# Patient Record
Sex: FEMALE | Race: WHITE | Employment: UNEMPLOYED | URBAN - METROPOLITAN AREA
[De-identification: names, ages, dates, MRNs, and addresses within clinical notes are randomized per-mention and may not be internally consistent; named-entity substitution may affect disease eponyms.]

---

## 2022-01-10 ENCOUNTER — TELEPHONE (OUTPATIENT)
Dept: ONCOLOGY | Age: 37
End: 2022-01-10

## 2022-02-10 NOTE — PROGRESS NOTES
1263 [facility] SPECIALISTS  [address] [place] [address], [address], 2439 [facility]  [address] [facility], [address] [place] [address]  [place], [address]   [phone]  [facility]      Patient ID:  Name:  Moisés Navarro  MRN:  [id number]  :  1985/36 y.o. Date:  2022      HISTORY OF PRESENT ILLNESS:  Moisés Navarro is a 39 y.o.  premenopausal female referred by Dr. Lawrence Kirkpatrick for surgery consult, patient desires to have hysterectomy and BSO. 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 Patient currently undergoing radiation to complete on . Denies any abnormal vaginal bleeding, abdominal pelvic pain. Patient discussed options of hormonal suppression versus oophorectomy. Patient desires hysterectomy/BSO  Labs:  Genetic testing: Pending      Imaging  none       ROS:   As above      There are no problems to display for this patient. Past Medical History:   Diagnosis Date    Breast cancer ([facility] [address]     H/O bilateral mastectomy 2021      Past Surgical History:   Procedure Laterality Date    HX APPENDECTOMY        OB History    No obstetric history on file. Social History     Tobacco Use    Smoking status: Never Smoker    Smokeless tobacco: Never Used   Substance Use Topics    Alcohol use: Not Currently      History reviewed. No pertinent family history. Current Outpatient Medications   Medication Sig    CHOLECALCIFEROL, VITAMIN D3, PO Take  by mouth.  acetaminophen (TYLENOL) 500 mg tablet      No current facility-administered medications for this visit.      Allergies   Allergen Reactions    Codeine Swelling    Motrin [Ibuprofen] Swelling          OBJECTIVE:    Physical Exam  VITAL SIGNS: Visit Vitals  /65 (BP 1 Location: Right upper arm, BP Patient Position: Sitting)   Pulse 68   Temp 98.5 °F (36.9 °C) (Oral)   Ht 5' 5\" (1.651 m)   Wt 47.6 kg (105 lb)   SpO2 100%   BMI 17.47 kg/m²      GENERAL ROSA ISELA: in no apparent distress and well developed and well nourished   MUSCULOSKEL: no joint tenderness, deformity or swelling   INTEGUMENT:  warm and dry, no rashes or lesions   ABDOMEN . soft, NT, ND, No masses appreciated   EXTREMITIES: extremities normal, atraumatic, no cyanosis or edema   PELVIC: Exam deferred. RECTAL: deferred   RACHEL SURVEY: Cervical, supraclavicular, axillary and inguinal nodes normal.   NEURO: Grossly normal         IMPRESSION/PLAN:  1. Breast cancer  -Discussed reasonable option to remove ovaries and uterus as a means to reduce estrogen exposure  -Discussed plan for hysterectomy, BSO  Discussed robotic approach  -Patient will return next week for pelvic exam.  -Risks, benefits and alternatives of surgery discussed in detail. . Risks including bleeding, infection, blood clot, injury to nearby organs including bladder, bowel, ureters and blood vessels.          The total time spent was 60 minutes regarding this patients diagnosis of breast cancer and >50% of this time was spent counseling and coordinating care    82 [facility]  2/11/20229:34 AM

## 2022-02-11 ENCOUNTER — OFFICE VISIT (OUTPATIENT)
Dept: ONCOLOGY | Age: 37
End: 2022-02-11

## 2022-02-11 VITALS
BODY MASS INDEX: 17.49 KG/M2 | SYSTOLIC BLOOD PRESSURE: 130 MMHG | DIASTOLIC BLOOD PRESSURE: 65 MMHG | WEIGHT: 105 LBS | TEMPERATURE: 98.5 F | OXYGEN SATURATION: 100 % | HEART RATE: 68 BPM | HEIGHT: 65 IN

## 2022-02-11 DIAGNOSIS — Z01.818 PREOP TESTING: Primary | ICD-10-CM

## 2022-02-11 DIAGNOSIS — C50.019 MALIGNANT NEOPLASM OF AREOLA OF BREAST IN FEMALE, UNSPECIFIED ESTROGEN RECEPTOR STATUS, UNSPECIFIED LATERALITY (HCC): ICD-10-CM

## 2022-02-11 PROCEDURE — 99205 OFFICE O/P NEW HI 60 MIN: CPT | Performed by: OBSTETRICS & GYNECOLOGY

## 2022-02-11 RX ORDER — ACETAMINOPHEN 500 MG
TABLET ORAL
COMMUNITY
Start: 2021-11-04 | End: 2022-03-08

## 2022-02-11 NOTE — LETTER
2022 9:20 AM    Patient:  France Villavicencio   YOB: 1985  Date of Visit: 2022      Dear Ambrosio Cagle MD  [address] [place] [address]  Via In DO Ronda  [address] [place] [phone]  Via Fax: [phone]: Thank you for referring Ms. France Villavicencio to me for evaluation/treatment. Below are the relevant portions of my assessment and plan of care. If you have questions, please do not hesitate to call me. I look forward to following Ms. Barbosa along with you. 1263 [facility]  [address], [address], 2150 [facility]  [address] [address] [facility], [facility] [address] [facility] [address]  [phone]  Magi Kelsey SORTO      Patient ID:  Name:  France Villavicencio  MRN:  [id number]  :  1985/36 y.o. Date:  2022      HISTORY OF PRESENT ILLNESS:  France Villavicencio is a 39 y.o.  premenopausal female referred by Dr. Elvria Campbell for surgery consult, patient desires to have hysterectomy and BSO. 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 Patient currently undergoing radiation to complete on . Denies any abnormal vaginal bleeding, abdominal pelvic pain. Patient discussed options of hormonal suppression versus oophorectomy. Patient desires hysterectomy/BSO  Labs:  Genetic testing: Pending      Imaging  none       ROS:   As above      There are no problems to display for this patient. Past Medical History:   Diagnosis Date    Breast cancer ([facility] [address]     H/O bilateral mastectomy 2021      Past Surgical History:   Procedure Laterality Date    HX APPENDECTOMY        OB History    No obstetric history on file.        Social History     Tobacco Use    Smoking status: Never Smoker    Smokeless tobacco: Never Used   Substance Use Topics    Alcohol use: Not Currently      History reviewed. No pertinent family history. Current Outpatient Medications   Medication Sig    CHOLECALCIFEROL, VITAMIN D3, PO Take  by mouth.  acetaminophen (TYLENOL) 500 mg tablet      No current facility-administered medications for this visit. Allergies   Allergen Reactions    Codeine Swelling    Motrin [Ibuprofen] Swelling          OBJECTIVE:    Physical Exam  VITAL SIGNS: Visit Vitals  /65 (BP 1 Location: Right upper arm, BP Patient Position: Sitting)   Pulse 68   Temp 98.5 °F (36.9 °C) (Oral)   Ht 5' 5\" (1.651 m)   Wt 47.6 kg (105 lb)   SpO2 100%   BMI 17.47 kg/m²      GENERAL ROSA ISELA: in no apparent distress and well developed and well nourished   MUSCULOSKEL: no joint tenderness, deformity or swelling   INTEGUMENT:  warm and dry, no rashes or lesions   ABDOMEN . soft, NT, ND, No masses appreciated   EXTREMITIES: extremities normal, atraumatic, no cyanosis or edema   PELVIC: Exam deferred. RECTAL: deferred   RACHEL SURVEY: Cervical, supraclavicular, axillary and inguinal nodes normal.   NEURO: Grossly normal         IMPRESSION/PLAN:  1. Breast cancer  -Discussed reasonable option to remove ovaries and uterus as a means to reduce estrogen exposure  -Discussed plan for hysterectomy, BSO  Discussed robotic approach  -Patient will return next week for pelvic exam.  -Risks, benefits and alternatives of surgery discussed in detail. . Risks including bleeding, infection, blood clot, injury to nearby organs including bladder, bowel, ureters and blood vessels. The total time spent was 60 minutes regarding this patients diagnosis of breast cancer and >50% of this time was spent counseling and coordinating care    82 [facility]  2/11/20229:34 Cong Duron is a 39 y.o. female presents in office for surgery consult for hysterectomy. Genetic results pending.     Chief Complaint   Patient presents with    New Patient        Do you have any unusual vaginal bleeding, discharge or irritation? no  Do you have any changes in your bowel movements? no  Have you been experiencing nausea or vomiting? no  Have you been experiencing any continuous or worsening abdominal pain? no  Any urinary burning? no    Visit Vitals  /65 (BP 1 Location: Right upper arm, BP Patient Position: Sitting)   Pulse 68   Temp 98.5 °F (36.9 °C) (Oral)   Ht 5' 5\" (1.651 m)   Wt 105 lb (47.6 kg)   SpO2 100%   BMI 17.47 kg/m²         1. Have you been to the ER, urgent care clinic since your last visit? Hospitalized since your last visit? No    2. Have you seen or consulted any other health care providers outside of the [address] [place] [address] since your last visit? Include any pap smears or colon screening. No    3 most recent PHQ Screens 2/11/2022   Little interest or pleasure in doing things Not at all   Feeling down, depressed, irritable, or hopeless Not at all   Total Score PHQ 2 0       No flowsheet data found. No flowsheet data found. No flowsheet data found.         Sincerely,      Harshad Sotelo MD

## 2022-02-11 NOTE — PROGRESS NOTES
Kenna Mckeon is a 39 y.o. female presents in office for surgery consult for hysterectomy. Genetic results pending. Chief Complaint   Patient presents with    New Patient        Do you have any unusual vaginal bleeding, discharge or irritation? no  Do you have any changes in your bowel movements? no  Have you been experiencing nausea or vomiting? no  Have you been experiencing any continuous or worsening abdominal pain? no  Any urinary burning? no    Visit Vitals  /65 (BP 1 Location: Right upper arm, BP Patient Position: Sitting)   Pulse 68   Temp 98.5 °F (36.9 °C) (Oral)   Ht 5' 5\" (1.651 m)   Wt 105 lb (47.6 kg)   SpO2 100%   BMI 17.47 kg/m²         1. Have you been to the ER, urgent care clinic since your last visit? Hospitalized since your last visit? No    2. Have you seen or consulted any other health care providers outside of the [address] [place] [address] since your last visit? Include any pap smears or colon screening. No    3 most recent PHQ Screens 2/11/2022   Little interest or pleasure in doing things Not at all   Feeling down, depressed, irritable, or hopeless Not at all   Total Score PHQ 2 0       No flowsheet data found. No flowsheet data found. No flowsheet data found.

## 2022-02-18 DIAGNOSIS — Z01.818 PREOP TESTING: ICD-10-CM

## 2022-02-21 ENCOUNTER — OFFICE VISIT (OUTPATIENT)
Dept: ONCOLOGY | Age: 37
End: 2022-02-21
Payer: OTHER GOVERNMENT

## 2022-02-21 ENCOUNTER — TRANSCRIBE ORDER (OUTPATIENT)
Dept: REGISTRATION | Age: 37
End: 2022-02-21

## 2022-02-21 ENCOUNTER — HOSPITAL ENCOUNTER (OUTPATIENT)
Dept: PREADMISSION TESTING | Age: 37
Discharge: HOME | End: 2022-02-21
Payer: OTHER GOVERNMENT

## 2022-02-21 VITALS
SYSTOLIC BLOOD PRESSURE: 127 MMHG | DIASTOLIC BLOOD PRESSURE: 79 MMHG | RESPIRATION RATE: 16 BRPM | WEIGHT: 107.4 LBS | HEART RATE: 75 BPM | BODY MASS INDEX: 17.87 KG/M2 | OXYGEN SATURATION: 100 % | TEMPERATURE: 98.7 F

## 2022-02-21 DIAGNOSIS — C50.019: Primary | ICD-10-CM

## 2022-02-21 DIAGNOSIS — Z01.818 PREOP TESTING: ICD-10-CM

## 2022-02-21 DIAGNOSIS — C50.019 MALIGNANT NEOPLASM OF AREOLA OF BREAST IN FEMALE, UNSPECIFIED ESTROGEN RECEPTOR STATUS, UNSPECIFIED LATERALITY (HCC): Primary | ICD-10-CM

## 2022-02-21 DIAGNOSIS — Z01.812 BLOOD TESTS PRIOR TO TREATMENT OR PROCEDURE: ICD-10-CM

## 2022-02-21 LAB
ALBUMIN SERPL-MCNC: 3.8 G/DL (ref 3.4–5)
ALBUMIN/GLOB SERPL: 1.3 {RATIO} (ref 0.8–1.7)
ALP SERPL-CCNC: 55 U/L (ref 45–117)
ALT SERPL-CCNC: 18 U/L (ref 13–56)
ANION GAP SERPL CALC-SCNC: 4 MMOL/L (ref 3–18)
AST SERPL-CCNC: 15 U/L (ref 10–38)
ATRIAL RATE: 66 BPM
BILIRUB SERPL-MCNC: 0.6 MG/DL (ref 0.2–1)
BUN SERPL-MCNC: 9 MG/DL (ref 7–18)
BUN/CREAT SERPL: 13 (ref 12–20)
CALCIUM SERPL-MCNC: 8.8 MG/DL (ref 8.5–10.1)
CALCULATED P AXIS, ECG09: 71 DEGREES
CALCULATED R AXIS, ECG10: 89 DEGREES
CALCULATED T AXIS, ECG11: 83 DEGREES
CHLORIDE SERPL-SCNC: 108 MMOL/L (ref 100–111)
CO2 SERPL-SCNC: 28 MMOL/L (ref 21–32)
CREAT SERPL-MCNC: 0.67 MG/DL (ref 0.6–1.3)
DIAGNOSIS, 93000: NORMAL
ERYTHROCYTE [DISTWIDTH] IN BLOOD BY AUTOMATED COUNT: 15.7 % (ref 11.6–14.5)
GLOBULIN SER CALC-MCNC: 3 G/DL (ref 2–4)
GLUCOSE SERPL-MCNC: 76 MG/DL (ref 74–99)
HCG SERPL QL: NEGATIVE
HCT VFR BLD AUTO: 37.6 % (ref 35–45)
HGB BLD-MCNC: 11.8 G/DL (ref 12–16)
MCH RBC QN AUTO: 25.2 PG (ref 24–34)
MCHC RBC AUTO-ENTMCNC: 31.4 G/DL (ref 31–37)
MCV RBC AUTO: 80.2 FL (ref 78–100)
NRBC # BLD: 0 K/UL (ref 0–0.01)
NRBC BLD-RTO: 0 PER 100 WBC
P-R INTERVAL, ECG05: 148 MS
PLATELET # BLD AUTO: 304 K/UL (ref 135–420)
PMV BLD AUTO: 11.2 FL (ref 9.2–11.8)
POTASSIUM SERPL-SCNC: 3.9 MMOL/L (ref 3.5–5.5)
PROT SERPL-MCNC: 6.8 G/DL (ref 6.4–8.2)
Q-T INTERVAL, ECG07: 392 MS
QRS DURATION, ECG06: 82 MS
QTC CALCULATION (BEZET), ECG08: 410 MS
RBC # BLD AUTO: 4.69 M/UL (ref 4.2–5.3)
SODIUM SERPL-SCNC: 140 MMOL/L (ref 136–145)
VENTRICULAR RATE, ECG03: 66 BPM
WBC # BLD AUTO: 5.3 K/UL (ref 4.6–13.2)

## 2022-02-21 PROCEDURE — 84703 CHORIONIC GONADOTROPIN ASSAY: CPT

## 2022-02-21 PROCEDURE — 36415 COLL VENOUS BLD VENIPUNCTURE: CPT

## 2022-02-21 PROCEDURE — 93005 ELECTROCARDIOGRAM TRACING: CPT

## 2022-02-21 PROCEDURE — 80053 COMPREHEN METABOLIC PANEL: CPT

## 2022-02-21 PROCEDURE — 99213 OFFICE O/P EST LOW 20 MIN: CPT | Performed by: OBSTETRICS & GYNECOLOGY

## 2022-02-21 PROCEDURE — 85027 COMPLETE CBC AUTOMATED: CPT

## 2022-02-21 NOTE — PROGRESS NOTES
1263 [facility] SPECIAL[facility]  [address] [place] [address], [address], [facility] [facility]  [address] [facility], [address] [place] [address]  [place], [address]   [phone]  [facility]      Patient ID:  Name:  Twyla Frias  MRN:  [id number]  :  1985/36 y.o. Date:  2022      HISTORY OF PRESENT ILLNESS:  Twyla Frias is a 39 y.o.  premenopausal female referred by Dr. Lawrence Kirkpatrick for surgery consult, patient desires to have hysterectomy and BSO. 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 Patient currently undergoing radiation to complete on . Denies any abnormal vaginal bleeding, abdominal pelvic pain. Patient discussed options of hormonal suppression versus oophorectomy. Patient desires hysterectomy/BSO patient here for pelvic exam  Labs:  Genetic testing: Pending      Imaging  none       ROS:   As above      There are no problems to display for this patient. Past Medical History:   Diagnosis Date    Breast cancer ([facility] [address]     H/O bilateral mastectomy 2021      Past Surgical History:   Procedure Laterality Date    HX APPENDECTOMY        OB History    No obstetric history on file. Social History     Tobacco Use    Smoking status: Never Smoker    Smokeless tobacco: Never Used   Substance Use Topics    Alcohol use: Not Currently      No family history on file. Current Outpatient Medications   Medication Sig    CHOLECALCIFEROL, VITAMIN D3, PO Take  by mouth.  acetaminophen (TYLENOL) 500 mg tablet      No current facility-administered medications for this visit.      Allergies   Allergen Reactions    Codeine Swelling    Motrin [Ibuprofen] Swelling          OBJECTIVE:    Physical Exam  VITAL SIGNS: Visit Vitals  /79 (BP 1 Location: Right arm, BP Patient Position: Sitting, BP Cuff Size: Adult)   Pulse 75   Temp 98.7 °F (37.1 °C) (Oral)   Resp 16   Wt 48.7 kg (107 lb 6.4 oz)   SpO2 100%   BMI 17.87 kg/m²      GENERAL ROSA ISELA: in no apparent distress and well developed and well nourished   MUSCULOSKEL: no joint tenderness, deformity or swelling   INTEGUMENT:  warm and dry, no rashes or lesions   ABDOMEN . soft, NT, ND, No masses appreciated   EXTREMITIES: extremities normal, atraumatic, no cyanosis or edema   PELVIC: Nefg. Spec exam revealed normal-appearing vaginal mucosa and cervix. Bimanual exam revealed normal uterus tubes and ovaries no adnexal masses appreciated. RECTAL: deferred   RACHEL SURVEY: Cervical, supraclavicular, axillary and inguinal nodes normal.   NEURO: Grossly normal         IMPRESSION/PLAN:  1. Breast cancer  -Discussed reasonable option to remove ovaries and uterus as a means to reduce estrogen exposure  -Discussed plan for hysterectomy, BSO  Discussed robotic approach  -Pelvic exam performed  -Risks, benefits and alternatives of surgery discussed in detail. . Risks including bleeding, infection, blood clot, injury to nearby organs including bladder, bowel, ureters and blood vessels.          The total time spent was 60 minutes regarding this patients diagnosis of breast cancer and >50% of this time was spent counseling and coordinating care    82 [facility]  3/00/56247:85 AM

## 2022-02-21 NOTE — H&P (VIEW-ONLY)
1263 [facility] SPECIALISTS  [address] [place] [address], [address], 1390 [facility]  [address] [facility], [address] [facility]  [place], [address] [facility]224 [phone] [phone]  Jeremie Ferguson       Patient ID:  Name:  Rosalia Talavera  MRN:  [id number]  :  1985/36 y.o. Date:  2022      HISTORY OF PRESENT ILLNESS:  Rosalia Talavera is a 39 y.o.  premenopausal female referred by Dr. Apollo Freeman for surgery consult, patient desires to have hysterectomy and BSO. 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 Patient currently undergoing radiation to complete on . Denies any abnormal vaginal bleeding, abdominal pelvic pain. Patient discussed options of hormonal suppression versus oophorectomy. Patient desires hysterectomy/BSO patient here for pelvic exam  Labs:  Genetic testing: Pending      Imaging  none       ROS:   As above      There are no problems to display for this patient. Past Medical History:   Diagnosis Date    Breast cancer ([facility] [address]     H/O bilateral mastectomy 2021      Past Surgical History:   Procedure Laterality Date    HX APPENDECTOMY        OB History    No obstetric history on file. Social History     Tobacco Use    Smoking status: Never Smoker    Smokeless tobacco: Never Used   Substance Use Topics    Alcohol use: Not Currently      No family history on file. Current Outpatient Medications   Medication Sig    CHOLECALCIFEROL, VITAMIN D3, PO Take  by mouth.  acetaminophen (TYLENOL) 500 mg tablet      No current facility-administered medications for this visit.      Allergies   Allergen Reactions    Codeine Swelling    Motrin [Ibuprofen] Swelling          OBJECTIVE:    Physical Exam  VITAL SIGNS: Visit Vitals  /79 (BP 1 Location: Right arm, BP Patient Position: Sitting, BP Cuff Size: Adult)   Pulse 75   Temp 98.7 °F (37.1 °C) (Oral)   Resp 16   Wt 48.7 kg (107 lb 6.4 oz)   SpO2 100%   BMI 17.87 kg/m²      GENERAL ROSA ISELA: in no apparent distress and well developed and well nourished   MUSCULOSKEL: no joint tenderness, deformity or swelling   INTEGUMENT:  warm and dry, no rashes or lesions   ABDOMEN . soft, NT, ND, No masses appreciated   EXTREMITIES: extremities normal, atraumatic, no cyanosis or edema   PELVIC: Nefg. Spec exam revealed normal-appearing vaginal mucosa and cervix. Bimanual exam revealed normal uterus tubes and ovaries no adnexal masses appreciated. RECTAL: deferred   RACHEL SURVEY: Cervical, supraclavicular, axillary and inguinal nodes normal.   NEURO: Grossly normal         IMPRESSION/PLAN:  1. Breast cancer  -Discussed reasonable option to remove ovaries and uterus as a means to reduce estrogen exposure  -Discussed plan for hysterectomy, BSO  Discussed robotic approach  -Pelvic exam performed  -Risks, benefits and alternatives of surgery discussed in detail. . Risks including bleeding, infection, blood clot, injury to nearby organs including bladder, bowel, ureters and blood vessels.          The total time spent was 60 minutes regarding this patients diagnosis of breast cancer and >50% of this time was spent counseling and coordinating care    82 [facility]  0/02/69891:32 AM

## 2022-02-21 NOTE — PROGRESS NOTES
Patient here ambulatory for follow up prior to having her \"hysterectomy\". She is completing radiation for breast cancer as of Friiday. Denies any complaints as this time.

## 2022-02-21 NOTE — LETTER
2/21/2022    Patient: Claudia Shankar   YOB: 1985   Date of Visit: 2/21/2022     Jane Cevallos, [address] [place] [id number]  Via Fax: [phone]     Mera Sloano MD  [address] [place] [address]  Via In [facility] [address]    Dear Jane Cevallos, [facility]  Mera Solano MD,      Thank you for referring Ms. Hilda Barbosa to [facility] for evaluation. My notes for this consultation are attached. If you have questions, please do not hesitate to call me. I look forward to following your patient along with you.       Sincerely,    Danika Christian MD

## 2022-03-04 ENCOUNTER — HOSPITAL ENCOUNTER (OUTPATIENT)
Dept: PREADMISSION TESTING | Age: 37
Discharge: HOME | End: 2022-03-04
Payer: OTHER GOVERNMENT

## 2022-03-04 PROCEDURE — U0003 INFECTIOUS AGENT DETECTION BY NUCLEIC ACID (DNA OR RNA); SEVERE ACUTE RESPIRATORY SYNDROME CORONAVIRUS 2 (SARS-COV-2) (CORONAVIRUS DISEASE [COVID-19]), AMPLIFIED PROBE TECHNIQUE, MAKING USE OF HIGH THROUGHPUT TECHNOLOGIES AS DESCRIBED BY CMS-2020-01-R: HCPCS

## 2022-03-05 LAB — SARS-COV-2, COV2NT: NOT DETECTED

## 2022-03-07 ENCOUNTER — OFFICE VISIT (OUTPATIENT)
Dept: ONCOLOGY | Age: 37
End: 2022-03-07

## 2022-03-07 DIAGNOSIS — Z71.89 SURGICAL COUNSELING VISIT: Primary | ICD-10-CM

## 2022-03-07 NOTE — PROGRESS NOTES
Reviewed information packet for a robotic total laparoscopic hysterectomy, bilateral salpingo-oophorectomy scheduled on 3/10/22 at 9:30 am with an arrival time of 7:30 am. Patient was given information packet that included pre-op and post-op instructions as well as the scheduled date, start time, arrival time, and length of the surgery. Patient expressed understanding and had no further questions. Patient was encouraged to call if they have questions later. The patient was counseled at length about the risks of norma Covid-19 during their perioperative period and any recovery window from their procedure. The patient was made aware that norma Covid-19  may worsen their prognosis for recovering from their procedure and lend to a higher morbidity and/or mortality risk. All material risks, benefits, and reasonable alternatives including postponing the procedure were discussed. The patient does wish to proceed with the procedure at this time.
[id number]

## 2022-03-08 ENCOUNTER — HOSPITAL ENCOUNTER (OUTPATIENT)
Dept: PREADMISSION TESTING | Age: 37
Discharge: HOME | End: 2022-03-08

## 2022-03-08 VITALS — WEIGHT: 107 LBS | BODY MASS INDEX: 17.83 KG/M2 | HEIGHT: 65 IN

## 2022-03-08 RX ORDER — HEPARIN SODIUM 5000 [USP'U]/ML
5000 INJECTION, SOLUTION INTRAVENOUS; SUBCUTANEOUS ONCE
Status: CANCELLED | OUTPATIENT
Start: 2022-03-08 | End: 2022-03-08

## 2022-03-08 RX ORDER — BISMUTH SUBSALICYLATE 262 MG
1 TABLET,CHEWABLE ORAL DAILY
COMMUNITY

## 2022-03-08 RX ORDER — SODIUM CHLORIDE, SODIUM LACTATE, POTASSIUM CHLORIDE, CALCIUM CHLORIDE 600; 310; 30; 20 MG/100ML; MG/100ML; MG/100ML; MG/100ML
125 INJECTION, SOLUTION INTRAVENOUS CONTINUOUS
Status: CANCELLED | OUTPATIENT
Start: 2022-03-08

## 2022-03-08 NOTE — PERIOP NOTES
PAT phone interview completed. Patient made aware to be NPO. Patient stated she had two doses of COVID vaccine. Patient statted she had COVID test 3/4. Patient made aware not to wear jewelry, makeup, nail polish, lotion, oil or spray on DOS. Patient stated she has a ride for discharge and someone to stay with her for 24 hours after procedure. Reviewed surgical skin preparation with patient. Patient stated understanding. Opportunity for questions given. Patient stated she does not have a DNR order.

## 2022-03-09 ENCOUNTER — TELEPHONE (OUTPATIENT)
Dept: ONCOLOGY | Age: 37
End: 2022-03-09

## 2022-03-09 NOTE — TELEPHONE ENCOUNTER
Patient confirmed arrival and surgery start time for 3/10/2022. Patient is to arrive at 7:30 AM with a 9:30 AM surgery start time.

## 2022-03-10 ENCOUNTER — ANESTHESIA (OUTPATIENT)
Dept: SURGERY | Age: 37
End: 2022-03-10
Payer: OTHER GOVERNMENT

## 2022-03-10 ENCOUNTER — HOSPITAL ENCOUNTER (OUTPATIENT)
Age: 37
Setting detail: OUTPATIENT SURGERY
Discharge: HOME | End: 2022-03-10
Attending: OBSTETRICS & GYNECOLOGY | Admitting: OBSTETRICS & GYNECOLOGY
Payer: OTHER GOVERNMENT

## 2022-03-10 ENCOUNTER — ANESTHESIA EVENT (OUTPATIENT)
Dept: SURGERY | Age: 37
End: 2022-03-10
Payer: OTHER GOVERNMENT

## 2022-03-10 VITALS
TEMPERATURE: 97.4 F | BODY MASS INDEX: 17.04 KG/M2 | HEIGHT: 65 IN | OXYGEN SATURATION: 100 % | RESPIRATION RATE: 16 BRPM | HEART RATE: 61 BPM | DIASTOLIC BLOOD PRESSURE: 44 MMHG | SYSTOLIC BLOOD PRESSURE: 93 MMHG | WEIGHT: 102.3 LBS

## 2022-03-10 DIAGNOSIS — Z85.3 HISTORY OF BREAST CANCER: ICD-10-CM

## 2022-03-10 DIAGNOSIS — R10.2 PELVIC PAIN: ICD-10-CM

## 2022-03-10 LAB
ABO + RH BLD: NORMAL
BLOOD GROUP ANTIBODIES SERPL: NORMAL
HCG UR QL: NEGATIVE
SPECIMEN EXP DATE BLD: NORMAL

## 2022-03-10 PROCEDURE — 74011250636 HC RX REV CODE- 250/636: Performed by: NURSE ANESTHETIST, CERTIFIED REGISTERED

## 2022-03-10 PROCEDURE — 77030008477 HC STYL SATN SLP COVD -A: Performed by: STUDENT IN AN ORGANIZED HEALTH CARE EDUCATION/TRAINING PROGRAM

## 2022-03-10 PROCEDURE — 58571 TLH W/T/O 250 G OR LESS: CPT | Performed by: OBSTETRICS & GYNECOLOGY

## 2022-03-10 PROCEDURE — 77030006643: Performed by: STUDENT IN AN ORGANIZED HEALTH CARE EDUCATION/TRAINING PROGRAM

## 2022-03-10 PROCEDURE — 77030040830 HC CATH URETH FOL MDII -A: Performed by: OBSTETRICS & GYNECOLOGY

## 2022-03-10 PROCEDURE — 74011250636 HC RX REV CODE- 250/636: Performed by: OBSTETRICS & GYNECOLOGY

## 2022-03-10 PROCEDURE — 77030008683 HC TU ET CUF COVD -A: Performed by: STUDENT IN AN ORGANIZED HEALTH CARE EDUCATION/TRAINING PROGRAM

## 2022-03-10 PROCEDURE — 77030002933 HC SUT MCRYL J&J -A: Performed by: OBSTETRICS & GYNECOLOGY

## 2022-03-10 PROCEDURE — 77030025805 HC MANIP UTER RUMI COOP -B: Performed by: OBSTETRICS & GYNECOLOGY

## 2022-03-10 PROCEDURE — 77030035277 HC OBTRTR BLDELSS DISP INTU -B: Performed by: OBSTETRICS & GYNECOLOGY

## 2022-03-10 PROCEDURE — 76060000033 HC ANESTHESIA 1 TO 1.5 HR: Performed by: OBSTETRICS & GYNECOLOGY

## 2022-03-10 PROCEDURE — 74011000250 HC RX REV CODE- 250: Performed by: NURSE ANESTHETIST, CERTIFIED REGISTERED

## 2022-03-10 PROCEDURE — C9290 INJ, BUPIVACAINE LIPOSOME: HCPCS | Performed by: OBSTETRICS & GYNECOLOGY

## 2022-03-10 PROCEDURE — 77030010507 HC ADH SKN DERMBND J&J -B: Performed by: OBSTETRICS & GYNECOLOGY

## 2022-03-10 PROCEDURE — 76210000016 HC OR PH I REC 1 TO 1.5 HR: Performed by: OBSTETRICS & GYNECOLOGY

## 2022-03-10 PROCEDURE — 77030016151 HC PROTCTR LNS DFOG COVD -B: Performed by: OBSTETRICS & GYNECOLOGY

## 2022-03-10 PROCEDURE — 81025 URINE PREGNANCY TEST: CPT

## 2022-03-10 PROCEDURE — 77030040361 HC SLV COMPR DVT MDII -B: Performed by: OBSTETRICS & GYNECOLOGY

## 2022-03-10 PROCEDURE — 77030031492 HC PRT ACC BLNT AIRSEAL CNMD -B: Performed by: OBSTETRICS & GYNECOLOGY

## 2022-03-10 PROCEDURE — 88307 TISSUE EXAM BY PATHOLOGIST: CPT

## 2022-03-10 PROCEDURE — 77030020782 HC GWN BAIR PAWS FLX 3M -B: Performed by: OBSTETRICS & GYNECOLOGY

## 2022-03-10 PROCEDURE — 77030014063 HC TIP MANIP UTER COOP -B: Performed by: OBSTETRICS & GYNECOLOGY

## 2022-03-10 PROCEDURE — 74011000250 HC RX REV CODE- 250: Performed by: OBSTETRICS & GYNECOLOGY

## 2022-03-10 PROCEDURE — 76010000934 HC OR TIME 1 TO 1.5HR INTENSV - TIER 2: Performed by: OBSTETRICS & GYNECOLOGY

## 2022-03-10 PROCEDURE — 77030008574 HC TBNG SUC IRR STRY -B: Performed by: OBSTETRICS & GYNECOLOGY

## 2022-03-10 PROCEDURE — 77030002966 HC SUT PDS J&J -A: Performed by: OBSTETRICS & GYNECOLOGY

## 2022-03-10 PROCEDURE — 86900 BLOOD TYPING SEROLOGIC ABO: CPT

## 2022-03-10 PROCEDURE — 77030020703 HC SEAL CANN DISP INTU -B: Performed by: OBSTETRICS & GYNECOLOGY

## 2022-03-10 PROCEDURE — 76210000026 HC REC RM PH II 1 TO 1.5 HR: Performed by: OBSTETRICS & GYNECOLOGY

## 2022-03-10 PROCEDURE — 2709999900 HC NON-CHARGEABLE SUPPLY: Performed by: OBSTETRICS & GYNECOLOGY

## 2022-03-10 PROCEDURE — 36415 COLL VENOUS BLD VENIPUNCTURE: CPT

## 2022-03-10 RX ORDER — NALOXONE HYDROCHLORIDE 0.4 MG/ML
0.04 INJECTION, SOLUTION INTRAMUSCULAR; INTRAVENOUS; SUBCUTANEOUS
Status: DISCONTINUED | OUTPATIENT
Start: 2022-03-10 | End: 2022-03-10 | Stop reason: HOSPADM

## 2022-03-10 RX ORDER — MIDAZOLAM HYDROCHLORIDE 1 MG/ML
INJECTION, SOLUTION INTRAMUSCULAR; INTRAVENOUS AS NEEDED
Status: DISCONTINUED | OUTPATIENT
Start: 2022-03-10 | End: 2022-03-10 | Stop reason: HOSPADM

## 2022-03-10 RX ORDER — LIDOCAINE HYDROCHLORIDE 20 MG/ML
INJECTION, SOLUTION EPIDURAL; INFILTRATION; INTRACAUDAL; PERINEURAL AS NEEDED
Status: DISCONTINUED | OUTPATIENT
Start: 2022-03-10 | End: 2022-03-10 | Stop reason: HOSPADM

## 2022-03-10 RX ORDER — METOCLOPRAMIDE HYDROCHLORIDE 5 MG/ML
INJECTION INTRAMUSCULAR; INTRAVENOUS AS NEEDED
Status: DISCONTINUED | OUTPATIENT
Start: 2022-03-10 | End: 2022-03-10 | Stop reason: HOSPADM

## 2022-03-10 RX ORDER — DEXAMETHASONE SODIUM PHOSPHATE 4 MG/ML
INJECTION, SOLUTION INTRA-ARTICULAR; INTRALESIONAL; INTRAMUSCULAR; INTRAVENOUS; SOFT TISSUE AS NEEDED
Status: DISCONTINUED | OUTPATIENT
Start: 2022-03-10 | End: 2022-03-10 | Stop reason: HOSPADM

## 2022-03-10 RX ORDER — NALBUPHINE HYDROCHLORIDE 10 MG/ML
5 INJECTION, SOLUTION INTRAMUSCULAR; INTRAVENOUS; SUBCUTANEOUS
Status: DISCONTINUED | OUTPATIENT
Start: 2022-03-10 | End: 2022-03-10 | Stop reason: HOSPADM

## 2022-03-10 RX ORDER — SODIUM CHLORIDE, SODIUM LACTATE, POTASSIUM CHLORIDE, CALCIUM CHLORIDE 600; 310; 30; 20 MG/100ML; MG/100ML; MG/100ML; MG/100ML
50 INJECTION, SOLUTION INTRAVENOUS CONTINUOUS
Status: CANCELLED | OUTPATIENT
Start: 2022-03-10

## 2022-03-10 RX ORDER — SODIUM CHLORIDE 0.9 % (FLUSH) 0.9 %
5-40 SYRINGE (ML) INJECTION AS NEEDED
Status: DISCONTINUED | OUTPATIENT
Start: 2022-03-10 | End: 2022-03-10 | Stop reason: HOSPADM

## 2022-03-10 RX ORDER — KETAMINE HCL IN 0.9 % NACL 50 MG/5 ML
SYRINGE (ML) INTRAVENOUS AS NEEDED
Status: DISCONTINUED | OUTPATIENT
Start: 2022-03-10 | End: 2022-03-10 | Stop reason: HOSPADM

## 2022-03-10 RX ORDER — FENTANYL CITRATE 50 UG/ML
50 INJECTION, SOLUTION INTRAMUSCULAR; INTRAVENOUS
Status: DISCONTINUED | OUTPATIENT
Start: 2022-03-10 | End: 2022-03-10 | Stop reason: HOSPADM

## 2022-03-10 RX ORDER — HYDROMORPHONE HYDROCHLORIDE 1 MG/ML
0.5 INJECTION, SOLUTION INTRAMUSCULAR; INTRAVENOUS; SUBCUTANEOUS AS NEEDED
Status: DISCONTINUED | OUTPATIENT
Start: 2022-03-10 | End: 2022-03-10 | Stop reason: HOSPADM

## 2022-03-10 RX ORDER — EPHEDRINE SULFATE/0.9% NACL/PF 50 MG/5 ML
SYRINGE (ML) INTRAVENOUS AS NEEDED
Status: DISCONTINUED | OUTPATIENT
Start: 2022-03-10 | End: 2022-03-10 | Stop reason: HOSPADM

## 2022-03-10 RX ORDER — SODIUM CHLORIDE, SODIUM LACTATE, POTASSIUM CHLORIDE, CALCIUM CHLORIDE 600; 310; 30; 20 MG/100ML; MG/100ML; MG/100ML; MG/100ML
50 INJECTION, SOLUTION INTRAVENOUS CONTINUOUS
Status: DISCONTINUED | OUTPATIENT
Start: 2022-03-10 | End: 2022-03-10 | Stop reason: HOSPADM

## 2022-03-10 RX ORDER — SODIUM CHLORIDE 0.9 % (FLUSH) 0.9 %
5-40 SYRINGE (ML) INJECTION EVERY 8 HOURS
Status: DISCONTINUED | OUTPATIENT
Start: 2022-03-10 | End: 2022-03-10 | Stop reason: HOSPADM

## 2022-03-10 RX ORDER — SODIUM CHLORIDE, SODIUM LACTATE, POTASSIUM CHLORIDE, CALCIUM CHLORIDE 600; 310; 30; 20 MG/100ML; MG/100ML; MG/100ML; MG/100ML
125 INJECTION, SOLUTION INTRAVENOUS CONTINUOUS
Status: DISCONTINUED | OUTPATIENT
Start: 2022-03-10 | End: 2022-03-10 | Stop reason: HOSPADM

## 2022-03-10 RX ORDER — ONDANSETRON 2 MG/ML
INJECTION INTRAMUSCULAR; INTRAVENOUS AS NEEDED
Status: DISCONTINUED | OUTPATIENT
Start: 2022-03-10 | End: 2022-03-10 | Stop reason: HOSPADM

## 2022-03-10 RX ORDER — ROCURONIUM BROMIDE 10 MG/ML
INJECTION, SOLUTION INTRAVENOUS AS NEEDED
Status: DISCONTINUED | OUTPATIENT
Start: 2022-03-10 | End: 2022-03-10 | Stop reason: HOSPADM

## 2022-03-10 RX ORDER — CEFAZOLIN SODIUM/WATER 2 G/20 ML
2 SYRINGE (ML) INTRAVENOUS ONCE
Status: COMPLETED | OUTPATIENT
Start: 2022-03-10 | End: 2022-03-10

## 2022-03-10 RX ORDER — PROPOFOL 10 MG/ML
INJECTION, EMULSION INTRAVENOUS AS NEEDED
Status: DISCONTINUED | OUTPATIENT
Start: 2022-03-10 | End: 2022-03-10 | Stop reason: HOSPADM

## 2022-03-10 RX ORDER — DIPHENHYDRAMINE HYDROCHLORIDE 50 MG/ML
12.5 INJECTION, SOLUTION INTRAMUSCULAR; INTRAVENOUS
Status: DISCONTINUED | OUTPATIENT
Start: 2022-03-10 | End: 2022-03-10 | Stop reason: HOSPADM

## 2022-03-10 RX ORDER — HEPARIN SODIUM 5000 [USP'U]/ML
5000 INJECTION, SOLUTION INTRAVENOUS; SUBCUTANEOUS ONCE
Status: COMPLETED | OUTPATIENT
Start: 2022-03-10 | End: 2022-03-10

## 2022-03-10 RX ORDER — POLYETHYLENE GLYCOL 3350 17 G/17G
17 POWDER, FOR SOLUTION ORAL DAILY
COMMUNITY

## 2022-03-10 RX ORDER — FENTANYL CITRATE 50 UG/ML
INJECTION, SOLUTION INTRAMUSCULAR; INTRAVENOUS AS NEEDED
Status: DISCONTINUED | OUTPATIENT
Start: 2022-03-10 | End: 2022-03-10 | Stop reason: HOSPADM

## 2022-03-10 RX ADMIN — METOCLOPRAMIDE 10 MG: 5 INJECTION, SOLUTION INTRAMUSCULAR; INTRAVENOUS at 09:05

## 2022-03-10 RX ADMIN — HEPARIN SODIUM 5000 UNITS: 5000 INJECTION INTRAVENOUS; SUBCUTANEOUS at 08:54

## 2022-03-10 RX ADMIN — SODIUM CHLORIDE, SODIUM LACTATE, POTASSIUM CHLORIDE, AND CALCIUM CHLORIDE: 600; 310; 30; 20 INJECTION, SOLUTION INTRAVENOUS at 09:45

## 2022-03-10 RX ADMIN — SODIUM CHLORIDE, SODIUM LACTATE, POTASSIUM CHLORIDE, AND CALCIUM CHLORIDE 125 ML/HR: 600; 310; 30; 20 INJECTION, SOLUTION INTRAVENOUS at 08:41

## 2022-03-10 RX ADMIN — Medication 10 MG: at 09:12

## 2022-03-10 RX ADMIN — SODIUM CHLORIDE, SODIUM LACTATE, POTASSIUM CHLORIDE, AND CALCIUM CHLORIDE: 600; 310; 30; 20 INJECTION, SOLUTION INTRAVENOUS at 10:30

## 2022-03-10 RX ADMIN — PROPOFOL 150 MG: 10 INJECTION, EMULSION INTRAVENOUS at 09:14

## 2022-03-10 RX ADMIN — LIDOCAINE HYDROCHLORIDE 60 MG: 20 INJECTION, SOLUTION INTRAVENOUS at 09:14

## 2022-03-10 RX ADMIN — ONDANSETRON HYDROCHLORIDE 4 MG: 2 INJECTION INTRAMUSCULAR; INTRAVENOUS at 10:03

## 2022-03-10 RX ADMIN — SUGAMMADEX 200 MG: 100 INJECTION, SOLUTION INTRAVENOUS at 10:13

## 2022-03-10 RX ADMIN — FENTANYL CITRATE 50 MCG: 50 INJECTION, SOLUTION INTRAMUSCULAR; INTRAVENOUS at 09:10

## 2022-03-10 RX ADMIN — Medication 2 G: at 09:23

## 2022-03-10 RX ADMIN — Medication 20 MG: at 09:08

## 2022-03-10 RX ADMIN — ROCURONIUM BROMIDE 50 MG: 10 INJECTION, SOLUTION INTRAVENOUS at 09:14

## 2022-03-10 RX ADMIN — FENTANYL CITRATE 50 MCG: 50 INJECTION, SOLUTION INTRAMUSCULAR; INTRAVENOUS at 09:05

## 2022-03-10 RX ADMIN — MIDAZOLAM HYDROCHLORIDE 5 MG: 1 INJECTION, SOLUTION INTRAMUSCULAR; INTRAVENOUS at 09:05

## 2022-03-10 RX ADMIN — DEXAMETHASONE SODIUM PHOSPHATE 8 MG: 4 INJECTION, SOLUTION INTRAMUSCULAR; INTRAVENOUS at 09:33

## 2022-03-10 RX ADMIN — Medication 20 MG: at 09:21

## 2022-03-10 RX ADMIN — Medication 10 MG: at 10:10

## 2022-03-10 NOTE — PERIOP NOTES
Reviewed PTA medication list with patient/caregiver and patient/caregiver denies any additional medications. Patient admits to having a responsible adult care for them at home for at least 24 hours after surgery. Patient encouraged to use gown warming system and informed that using said warming gown to regulate body temperature prior to a procedure has been shown to help reduce the risks of blood clots and infection. Patient's pharmacy of choice verified and documented in PTA medication section. Dual skin assessment & fall risk band verification completed with Armin Valverde RN.

## 2022-03-10 NOTE — PERIOP NOTES
Notified Dr. Maikel Law that patient has limb alert to left arm. Per patient she had a mastectomy with lymph nodes removed. Per Dr. Maikel Law okay to proceed with one IV.

## 2022-03-10 NOTE — PERIOP NOTES
TRANSFER - OUT REPORT:    Verbal report given to Malathi Damian RN on Gary Coins  being transferred to Phase 2 for routine progression of care       Report consisted of patients Situation, Background, Assessment and   Recommendations(SBAR). Information from the following report(s) OR Summary, Procedure Summary, Intake/Output and MAR was reviewed with the receiving nurse. Lines:   Peripheral IV 03/10/22 Posterior;Right Hand (Active)   Site Assessment Clean, dry, & intact 03/10/22 1045   Phlebitis Assessment 0 03/10/22 1045   Infiltration Assessment 0 03/10/22 1045   Dressing Status Clean, dry, & intact 03/10/22 1045   Dressing Type Tape;Transparent 03/10/22 1045   Hub Color/Line Status Green; Infusing 03/10/22 1045   Alcohol Cap Used No 03/10/22 0838        Opportunity for questions and clarification was provided.       Patient transported with:   [employer]

## 2022-03-10 NOTE — DISCHARGE INSTRUCTIONS
Resume pre hospital diet  Drink plenty of fluids  Ambulate in house  Take prescriptions as directed  Sanitary pads only - no tampons, douching or sex until advised  Avoid heavy lifting or straining  Avoid constipation  Follow up with Dr. Elvie Venegas as scheduled     Laparoscopic Hysterectomy: What to Expect at [facility] 1808 laparoscopic hysterectomy is surgery to take out the uterus. Your doctor put a lighted tube and surgical tools through small cuts in your belly to remove the uterus. You can expect to feel better and stronger each day. But you might need pain medicine for a week or two. You may get tired easily or have less energy than usual. The tiredness may last for several weeks after surgery. And you also may have light vaginal bleeding for a few weeks. It's important to avoid lifting while you are recovering so that you can heal. It may take about 4 to 6 weeks to fully recover. The recovery time may be shorter for some people. This care sheet gives you a general idea about how long it will take for you to recover. But each person recovers at a different pace. Follow the steps below to get better as quickly as possible. How can you care for yourself at home? Activity    · Rest when you feel tired. Getting enough sleep will help you recover.     · Try to walk each day. Start by walking a little more than you did the day before. Bit by bit, increase the amount you walk. Walking boosts blood flow and helps prevent pneumonia and constipation.     · Avoid lifting anything that would make you strain. This may include heavy grocery bags and milk containers, a heavy briefcase or backpack, bags of cat litter or dog food, a vacuum , or a child.     · Avoid strenuous activities, such as biking, jogging, weight lifting, or aerobic exercise, until your doctor says it is okay.     · Ask your doctor when you can drive again.     · You may shower 24 to 48 hours after surgery, if your doctor okays it.  Kayli Noble the incision dry. Do not take a bath for the first 2 weeks, or until your doctor tells you it is okay.     · Ask your doctor when it is okay for you to have sex. Diet    · You can eat your normal diet. If your stomach is upset, try bland, low-fat foods like plain rice, broiled chicken, toast, and yogurt.     · If your bowel movements are not regular right after surgery, try to avoid constipation and straining. Drink plenty of water. Your doctor may suggest fiber, a stool softener, or a mild laxative. Medicines    · Your doctor will tell you if and when you can restart your medicines. You will also get instructions about taking any new medicines.     · If you take aspirin or some other blood thinner, ask your doctor if and when to start taking it again. Make sure that you understand exactly what your doctor wants you to do.     · Be safe with medicines. Read and follow all instructions on the label. ? If the doctor gave you a prescription medicine for pain, take it as prescribed. ? If you are not taking a prescription pain medicine, ask your doctor if you can take an over-the-counter medicine.     · If your doctor prescribed antibiotics, take them as directed. Do not stop taking them just because you feel better. You need to take the full course of antibiotics. Incision care    · You may have stitches over the cuts (incisions) the doctor made in your belly.     · If you have strips of tape on the cut (incision) the doctor made, leave the tape on for a week or until it falls off.     · Wash the area daily with warm, soapy water, and pat it dry. Don't use hydrogen peroxide or alcohol. They can slow healing.     · You may cover the area with a gauze bandage if it oozes fluid or rubs against clothing.     · Change the bandage every day. Other instructions    · You may have some light vaginal bleeding. Wear sanitary pads if needed. Do not douche or use tampons.    Follow-up care is a key part of your treatment and safety. Be sure to make and go to all appointments, and call your doctor if you are having problems. It's also a good idea to know your test results and keep a list of the medicines you take. When should you call for help? Call 911 anytime you think you may need emergency care. For example, call if:    · You passed out (lost consciousness).     · You have chest pain, are short of breath, or cough up blood. Call your doctor now or seek immediate medical care if:    · You have pain that does not get better after you take pain medicine.     · You cannot pass stools or gas.     · You have vaginal discharge that has increased in amount or smells bad.     · You are sick to your stomach or cannot drink fluids.     · You have loose stitches, or your incision comes open.     · Bright red blood has soaked through the bandage over your incision.     · You have signs of infection, such as:  ? Increased pain, swelling, warmth, or redness. ? Red streaks leading from the incision. ? Pus draining from the incision. ? A fever.     · You have severe vaginal bleeding. This means that you are soaking through your usual pads every hour for 2 or more hours.     · You have signs of a blood clot in your leg (called a deep vein thrombosis), such as:  ? Pain in your calf, back of the knee, thigh, or groin. ? Redness and swelling in your leg or groin. Watch closely for changes in your health, and be sure to contact your doctor if you have any problems. Where can you learn more? Go to http://www.gray.com/  Enter Q131 in the search box to learn more about \"Laparoscopic Hysterectomy: What to Expect at Home. \"  Current as of: November 22, 2021               Content Version: 13.2  © 7681-3506 [employer], [employer]. Care instructions adapted under license by [employer] (which disclaims liability or warranty for this information).  If you have questions about a medical condition or this instruction, always ask your healthcare professional. [place] [address] any warranty or liability for your use of this information. DISCHARGE SUMMARY from Nurse    PATIENT INSTRUCTIONS:    After general anesthesia or intravenous sedation, for 24 hours or while taking prescription Narcotics:  · Limit your activities  · Do not drive and operate hazardous machinery  · Do not make important personal or business decisions  · Do  not drink alcoholic beverages  · If you have not urinated within 8 hours after discharge, please contact your surgeon on call. Report the following to your surgeon:  · Excessive pain, swelling, redness or odor of or around the surgical area  · Temperature over 100.5  · Nausea and vomiting lasting longer than 4 hours or if unable to take medications  · Any signs of decreased circulation or nerve impairment to extremity: change in color, persistent  numbness, tingling, coldness or increase pain  · Any questions    What to do at Home:  Recommended activity: Ambulate in house,     If you experience any of the following symptoms above, please follow up with Dr. Rajni Alves. *  Please give a list of your current medications to your Primary Care Provider. *  Please update this list whenever your medications are discontinued, doses are      changed, or new medications (including over-the-counter products) are added. *  Please carry medication information at all times in case of emergency situations. These are general instructions for a healthy lifestyle:    No smoking/ No tobacco products/ Avoid exposure to second hand smoke  Surgeon General's Warning:  Quitting smoking now greatly reduces serious risk to your health.     Obesity, smoking, and sedentary lifestyle greatly increases your risk for illness    A healthy diet, regular physical exercise & weight monitoring are important for maintaining a healthy lifestyle    You may be retaining fluid if you have a history of heart failure or if you experience any of the following symptoms:  Weight gain of 3 pounds or more overnight or 5 pounds in a week, increased swelling in our hands or feet or shortness of breath while lying flat in bed. Please call your doctor as soon as you notice any of these symptoms; do not wait until your next office visit. Patient armband removed and shredded    The discharge information has been reviewed with the patient and caregiver. The patientand caregiver verbalized understanding. Discharge medications reviewed with the patient and caregiver and appropriate educational materials and side effects teaching were provided.   ___________________________________________________________________________________________________________________________________

## 2022-03-10 NOTE — ANESTHESIA POSTPROCEDURE EVALUATION
Post-Anesthesia Evaluation and Assessment    Cardiovascular Function/Vital Signs  Visit Vitals  BP (!) 95/57   Pulse 77   Temp 36.2 °C (97.1 °F)   Resp 18   Ht 5' 5\" (1.651 m)   Wt 46.4 kg (102 lb 4.8 oz)   SpO2 100%   BMI 17.02 kg/m²       Patient is status post Procedure(s):  ROBOTIC TOTAL LAPAROSCOPIC HYSTERECTOMY, BILATERAL SALPINGO-OOPHORECTOMY \"SPEC POP\". Nausea/Vomiting: Controlled. Postoperative hydration reviewed and adequate. Pain:  Pain Scale 1: (P) Numeric (0 - 10) (03/10/22 1100)  Pain Intensity 1: (P) 0 (03/10/22 1100)   Managed. Neurological Status:   Neuro (WDL): Exceptions to WDL (03/10/22 1045)   At baseline. Mental Status and Level of Consciousness: Baseline and appropriate for discharge. Pulmonary Status:   O2 Device: (P) None (Room air) (03/10/22 1100)   Adequate oxygenation and airway patent. Complications related to anesthesia: None    Post-anesthesia assessment completed. No concerns. Patient has met all discharge requirements.     Signed By: Alistair Benitez MD    March 10, 2022

## 2022-03-10 NOTE — PERIOP NOTES
TRANSFER - IN REPORT:    Verbal report received from Denny Juarez CRNA and Papito Gracia  being received from OR for routine progression of care      Report consisted of patients Situation, Background, Assessment and   Recommendations(SBAR). Information from the following report(s) OR Summary, Procedure Summary, Intake/Output and MAR was reviewed with the receiving nurse. Opportunity for questions and clarification was provided. Assessment completed upon patients arrival to unit and care assumed.

## 2022-03-10 NOTE — PERIOP NOTES
Reviewed BP's with Dr. Virginia Epperson - okay for discharge - pt tolerating po fluids and crackers - burping up air - abd soft

## 2022-03-10 NOTE — BRIEF OP NOTE
Brief Postoperative Note    Patient: Sheryle Simon  YOB: 1985  MRN: [id number]    Date of Procedure: 3/10/2022     Pre-Op Diagnosis: BREAST CANCER    Post-Op Diagnosis: Same as preoperative diagnosis.       Procedure(s):  ROBOTIC TOTAL LAPAROSCOPIC HYSTERECTOMY, BILATERAL SALPINGO-OOPHORECTOMY \"SPEC POP\"    Surgeon(s):  Cedric Scherer MD    Surgical Assistant: Surg Asst-1: Simón WEAVER    Anesthesia: General     Estimated Blood Loss (mL): 25    Complications: None    Specimens:   ID Type Source Tests Collected by Time Destination   1 : cervix, uterus, bilateral fallopian tubes and ovaries Preservative Uterus  Cedric Scherer MD 3/10/2022 6229 Pathology        Implants: * No implants in log *    Drains: * No LDAs found *    Findings: normal uterus, tubes, ovaries    Electronically Signed by Zeenat Miller MD on 3/10/2022 at 10:04 AM

## 2022-03-10 NOTE — ANESTHESIA PREPROCEDURE EVALUATION
Relevant Problems   No relevant active problems       Anesthetic History   No history of anesthetic complications     Pertinent negatives: No PONV       Review of Systems / Medical History  Patient summary reviewed, nursing notes reviewed and pertinent labs reviewed    Pulmonary  Within defined limits            Pertinent negatives: No COPD, asthma and sleep apnea     Neuro/Psych   Within defined limits        Pertinent negatives: No seizures and CVA   Cardiovascular  Within defined limits              Pertinent negatives: No hypertension, past MI and CHF  Exercise tolerance: >4 METS     GI/Hepatic/Renal  Within defined limits           Pertinent negatives: No liver disease and renal disease   Endo/Other        Cancer  Pertinent negatives: No diabetes   Other Findings              Physical Exam    Airway  Mallampati: I  TM Distance: 4 - 6 cm  Neck ROM: normal range of motion   Mouth opening: Normal     Cardiovascular    Rhythm: regular  Rate: normal         Dental    Dentition: Poor dentition     Pulmonary  Breath sounds clear to auscultation               Abdominal  GI exam deferred       Other Findings            Anesthetic Plan    ASA: 2  Anesthesia type: general          Induction: Intravenous  Anesthetic plan and risks discussed with: Patient

## 2022-03-10 NOTE — INTERVAL H&P NOTE
Update History & Physical    The Patient's History and Physical of February 21,    was reviewed with the patient and I examined the patient. There was no change. The surgical site was confirmed by the patient and me. Plan:  The risk, benefits, expected outcome, and alternative to the recommended procedure have been discussed with the patient. Patient understands and wants to proceed with the procedure.     Electronically signed by Emmanuel Mckeon MD on 3/10/2022 at 8:35 AM

## 2022-03-11 NOTE — OP NOTES
[facility]  OPERATIVE REPORT    Name:  Rochelle Amezcua  MR#:   [id number]  :  1985  ACCOUNT #:  [de-identified]  DATE OF SERVICE:  03/10/2022    PREOPERATIVE DIAGNOSIS:  Personal history of breast cancer. POSTOPERATIVE DIAGNOSIS:  Personal history of breast cancer. PROCEDURES PERFORMED:  Robotic-assisted total laparoscopic hysterectomy and bilateral salpingo-oophorectomy. SURGEON:  Alexi Rodriguez DO    ASSISTANT:  Morales Kevin    ASSISTANT TASKS:  Retraction, uterine manipulation, and closing. ANESTHESIA:  General.    FLUIDS:  1400. COMPLICATIONS:  None. SPECIMENS REMOVED:  Uterus, cervix, bilateral tubes and ovaries. IMPLANTS:  None. ESTIMATED BLOOD LOSS:  25 mL. URINE OUTPUT:  10 mL of clear urine. FINDINGS:  Laparoscopic findings revealed normal uterus, tubes and ovaries bilaterally, and all peritoneal surfaces were smooth. INDICATIONS:  The patient is a 63-year-old who has a history of breast cancer who underwent double  mastectomy on 2021. She completed radiation and desired oophorectomy as a form of hormonal suppression and hysterectomy for continued pelvic pain. PROCEDURE:  The patient was taken to the operating room where general anesthesia was obtained without difficulty. She was placed in dorsal lithotomy position, prepped and draped in sterile fashion. A surgical time-out was taken by the entire surgical team.  A Martínez catheter was placed. Sterile speculum was then placed in the patient's vagina. The anterior lip of the cervix was grasped with a single-tooth tenaculum. The cervix was then dilated, and a 6-cm REZA tip with a 3.5-cm Koh cup was advanced without difficulty. Attention was then turned to the abdomen where a Veress needle was introduced to the umbilicus, and pneumoperitoneum was obtained to 15 mmHg. An incision was then made above the umbilicus, and an 8-mm robotic trocar was advanced. Laparoscope was introduced. There was no evidence of injury from our entry. The patient was placed in Trendelenburg, with the findings above. At this point, an 8-mm robotic trocar was placed in the left and right side of the abdomen under direct visualization, and a 5-mm trocar was placed on the right side of the abdomen. The robot was then docked. At the console, attention was turned to the left side where the left round ligament was then divided using monopolar cautery. This peritoneal incision was extended superiorly along the left IP ligament. The left retroperitoneal space was opened. The ureter was identified. A window in the peritoneum overlying the ureter was made, and the left IP ligament was skeletonized, sealed, and divided. The adnexa was elevated, and the posteromedial leaf of the broad ligament was then incised towards the Koh cup. This was then repeated on the right side in the same fashion without difficulty. Next, the uterus was retroverted, and the anterior vesicouterine peritoneum was identified and incised along the lower uterine segment, and the bladder flap was created using blunt and sharp dissection. The uterine arteries were then skeletonized bilaterally, sealed, and divided. The cardinal ligaments were sealed and divided. An anterior colpotomy was made. This was carried circumferentially around the Koh cup, and the specimen was pulled through the vagina and sent for permanent specimen. The vaginal cuff was then closed with 2-0 PDS in a running two-layered fashion. The pelvis was irrigated copiously, and there was good hemostasis. At this point, all instruments were removed, the robot was undocked, all trocars were removed, and pneumoperitoneum was relieved. All skin sites were closed with 4-0 Monocryl and Dermabond, and Exparel was injected at the conclusion. The patient tolerated the procedure well.   Sponge, needle, and instrument counts were correct x2, and the patient was taken to the recovery room in stable condition.       Harry Campos DO [id number]  D:  03/10/2022 10:09  T:  03/10/2022 20:49  JOB #:  [id number]

## 2022-03-24 NOTE — PROGRESS NOTES
[facility]  [address], [address], [address] [facility]  [address] [facility], [address] [facility]  [place], [address] [place], [place] [id number] [phone]  Marleny Puentes DO      Postoperative Office Note  Patient ID:  Name: Sha Foster  MRM: [id number]  : 1985/36 y.o. Date: 3/24/2022    SUBJECTIVE:    This is a 39 y.o.  female who presents s/p Robotic-assisted total laparoscopic hysterectomy and bilateral salpingo-oophorectomy on 3/10/2022  Patient states she has been having intermittent right-sided pain. She also states that when she eats anything solid it upsets her stomach. She denies nausea. Having regular bowel movements. Abdominal pain relieved with passing flatus. Her pathology revealed   Uterus, cervix, and bilateral ovaries and fallopian tubes, hysterectomy        Cervix mild chronic inflammation and reactive changes.             No dysplasia identified.        Endometrium secretory endometrium.           No hyperplasia or carcinoma identified.        Myometrium no specific pathologic abnormality.        Bilateral ovaries corpora albicantia, corpus luteum, and benign        follicular cysts.           No malignancy identified.        Bilateral fallopian tubes paratubal cysts. Medications:     Current Outpatient Medications on File Prior to Visit   Medication Sig Dispense Refill    polyethylene glycol (Miralax) 17 gram/dose powder Take 17 g by mouth daily.  multivitamin (ONE A DAY) tablet Take 1 Tablet by mouth daily. No current facility-administered medications on file prior to visit. Allergies:      Allergies   Allergen Reactions    Codeine Swelling    Motrin [Ibuprofen] Swelling       OBJECTIVE:    Vitals:   Visit Vitals  LMP 03/10/2022       Physical Examination:    General:  alert, cooperative, no distress   Abdomen: soft, , non-tender   Incision: healing well   Pelvic: deferred   Rectal: not done   Extremity:   extremities normal, atraumatic, no cyanosis or edema     IMPRESSION/PLAN:    Jo-Ann Pate is Doing well postoperatively. .  She has a working diagnosis of h/o breast cancer. The operative procedures and clinical results have been reviewed with the patient. Implications of diagnosis discussed at length. All questions answered. Recommended using Gas-X for gas pain. Also explained if her GI symptoms do not improve may require referral to GI. Instructed her to call if no improvement within a week  I will see the patient back in 2 week(s). The patient is advised to call our office with any problems or concerns.     Aracelis Marcum DO  Gynecologic Oncology  3/24/2022/10:39 AM

## 2022-03-25 ENCOUNTER — OFFICE VISIT (OUTPATIENT)
Dept: ONCOLOGY | Age: 37
End: 2022-03-25
Payer: OTHER GOVERNMENT

## 2022-03-25 VITALS
RESPIRATION RATE: 16 BRPM | DIASTOLIC BLOOD PRESSURE: 76 MMHG | HEIGHT: 65 IN | WEIGHT: 98 LBS | BODY MASS INDEX: 16.33 KG/M2 | HEART RATE: 105 BPM | TEMPERATURE: 98.6 F | SYSTOLIC BLOOD PRESSURE: 119 MMHG

## 2022-03-25 DIAGNOSIS — C50.019 MALIGNANT NEOPLASM OF AREOLA OF BREAST IN FEMALE, UNSPECIFIED ESTROGEN RECEPTOR STATUS, UNSPECIFIED LATERALITY (HCC): Primary | ICD-10-CM

## 2022-03-25 PROCEDURE — 99024 POSTOP FOLLOW-UP VISIT: CPT | Performed by: OBSTETRICS & GYNECOLOGY

## 2022-03-25 NOTE — LETTER
3/25/2022    Patient: Charan Boykin   YOB: 1985   Date of Visit: 3/25/2022     Ham Cevallos, [address] [place] [id number]  Via Fax: [phone]    Dear Ham Cevallos, [facility],      Thank you for referring Ms. Hilda Barbosa to [facility] for evaluation. My notes for this consultation are attached. If you have questions, please do not hesitate to call me. I look forward to following your patient along with you.       Sincerely,    Roland Ontiveros MD

## 2022-03-25 NOTE — PROGRESS NOTES
Katie Scott is a 39 y.o. female presents in office for 2 week post op, she is accompanied by her mother. Chief Complaint   Patient presents with    Surgical Follow-up      Pt denies fever or drainage from incision sites. Pt is not using prescription pain medication. Do you have any unusual vaginal bleeding, discharge or irritation? Pt c/o a light pink discharge  Do you have any changes in your bowel movements? No  Have you been experiencing nausea or vomiting? No  Have you been experiencing any continuous or worsening abdominal pain? Pt c/o 2/10 abdominal pain with additional occasional sharp stabbing pain. Any urinary burning? No    Visit Vitals  /76 (BP 1 Location: Right arm, BP Patient Position: Sitting)   Pulse (!) 105   Temp 98.6 °F (37 °C) (Oral)   Resp 16   Ht 5' 5\" (1.651 m)   Wt 44.5 kg (98 lb)   BMI 16.31 kg/m²         1. Have you been to the ER, urgent care clinic since your last visit? Hospitalized since your last visit? No    2. Have you seen or consulted any other health care providers outside of the [address] [place] [address] since your last visit? Include any pap smears or colon screening. No    3 most recent PHQ Screens 2/11/2022   Little interest or pleasure in doing things Not at all   Feeling down, depressed, irritable, or hopeless Not at all   Total Score PHQ 2 0       No flowsheet data found.

## 2022-03-25 NOTE — LETTER
3/25/2022 10:39 AM    Patient:  Primitivo Castanon   YOB: 1985  Date of Visit: 3/25/2022      Dear Braden Lee MD  [address] [place] [address]  Via In [facility] [address]: Thank you for referring Ms. Malathi Pearson to me for evaluation/treatment. Below are the relevant portions of my assessment and plan of care. If you have questions, please do not hesitate to call me. I look forward to following Ms. Barbosa along with you. [address] [facility]  [address] [place] [address] Drive, [address], [address] [facility]  [address] [facility] [address] [facility], [address] [facility]  [phone]  Lynne Lab DO      Postoperative Office Note  Patient ID:  Name: Primitivo Castanon  MRM: [id number]  : 1985/36 y.o. Date: 3/24/2022    SUBJECTIVE:    This is a 39 y.o.  female who presents s/p Robotic-assisted total laparoscopic hysterectomy and bilateral salpingo-oophorectomy on 3/10/2022  Patient states she has been having intermittent right-sided pain. She also states that when she eats anything solid it upsets her stomach. She denies nausea. Having regular bowel movements. Abdominal pain relieved with passing flatus. Her pathology revealed   Uterus, cervix, and bilateral ovaries and fallopian tubes, hysterectomy        Cervix mild chronic inflammation and reactive changes.             No dysplasia identified.        Endometrium secretory endometrium.           No hyperplasia or carcinoma identified.        Myometrium no specific pathologic abnormality.        Bilateral ovaries corpora albicantia, corpus luteum, and benign        follicular cysts.           No malignancy identified.        Bilateral fallopian tubes paratubal cysts.     Medications:     Current Outpatient Medications on File Prior to Visit   Medication Sig Dispense Refill    polyethylene glycol (Miralax) 17 gram/dose powder Take 17 g by mouth daily.  multivitamin (ONE A DAY) tablet Take 1 Tablet by mouth daily. No current facility-administered medications on file prior to visit. Allergies: Allergies   Allergen Reactions    Codeine Swelling    Motrin [Ibuprofen] Swelling       OBJECTIVE:    Vitals:   Visit Vitals  LMP 03/10/2022       Physical Examination:    General:  alert, cooperative, no distress   Abdomen: soft, , non-tender   Incision: healing well   Pelvic: deferred   Rectal: not done   Extremity:   extremities normal, atraumatic, no cyanosis or edema     IMPRESSION/PLAN:    Shweta Hardy is Doing well postoperatively. .  She has a working diagnosis of h/o breast cancer. The operative procedures and clinical results have been reviewed with the patient. Implications of diagnosis discussed at length. All questions answered. Recommended using Gas-X for gas pain. Also explained if her GI symptoms do not improve may require referral to GI. Instructed her to call if no improvement within a week  I will see the patient back in 2 week(s). The patient is advised to call our office with any problems or concerns. Ghassan Balderas DO  Gynecologic Oncology  3/24/2022/10:39 AM      Shweta Hardy is a 39 y.o. female presents in office for 2 week post op, she is accompanied by her mother. Chief Complaint   Patient presents with    Surgical Follow-up      Pt denies fever or drainage from incision sites. Pt is not using prescription pain medication. Do you have any unusual vaginal bleeding, discharge or irritation? Pt c/o a light pink discharge  Do you have any changes in your bowel movements? No  Have you been experiencing nausea or vomiting? No  Have you been experiencing any continuous or worsening abdominal pain? Pt c/o 2/10 abdominal pain with additional occasional sharp stabbing pain. Any urinary burning?  No    Visit Vitals  /76 (BP 1 Location: Right arm, BP Patient Position: Sitting)   Pulse (!) 105   Temp 98.6 °F (37 °C) (Oral)   Resp 16   Ht 5' 5\" (1.651 m)   Wt 44.5 kg (98 lb)   BMI 16.31 kg/m²         1. Have you been to the ER, urgent care clinic since your last visit? Hospitalized since your last visit? No    2. Have you seen or consulted any other health care providers outside of the [address] [place] [address] since your last visit? Include any pap smears or colon screening. No    3 most recent PHQ Screens 2/11/2022   Little interest or pleasure in doing things Not at all   Feeling down, depressed, irritable, or hopeless Not at all   Total Score PHQ 2 0       No flowsheet data found.       Sincerely,      Luz Stein MD

## 2022-04-05 ENCOUNTER — TELEPHONE (OUTPATIENT)
Dept: ONCOLOGY | Age: 37
End: 2022-04-05

## 2022-04-11 ENCOUNTER — OFFICE VISIT (OUTPATIENT)
Dept: ONCOLOGY | Age: 37
End: 2022-04-11
Payer: OTHER GOVERNMENT

## 2022-04-11 VITALS
HEIGHT: 65 IN | TEMPERATURE: 98.5 F | HEART RATE: 77 BPM | OXYGEN SATURATION: 100 % | WEIGHT: 98 LBS | BODY MASS INDEX: 16.33 KG/M2 | RESPIRATION RATE: 12 BRPM | SYSTOLIC BLOOD PRESSURE: 113 MMHG | DIASTOLIC BLOOD PRESSURE: 73 MMHG

## 2022-04-11 DIAGNOSIS — C50.019 MALIGNANT NEOPLASM OF AREOLA OF BREAST IN FEMALE, UNSPECIFIED ESTROGEN RECEPTOR STATUS, UNSPECIFIED LATERALITY (HCC): Primary | ICD-10-CM

## 2022-04-11 PROCEDURE — 99024 POSTOP FOLLOW-UP VISIT: CPT | Performed by: OBSTETRICS & GYNECOLOGY

## 2022-04-11 NOTE — LETTER
4/11/2022 3:42 PM    Patient:  David Parson   YOB: 1985  Date of Visit: 4/11/2022      Dear Katey Burnham MD  [address] [place] [address]  Via In [facility] [address]: Thank you for referring Ms. Nito Pulliam to me for evaluation/treatment. Below are the relevant portions of my assessment and plan of care. If you have questions, please do not hesitate to call me. I look forward to following Ms. Barbosa along with you. David Parson is a 39 y.o. female presents in office for 4 week post op. Chief Complaint   Patient presents with    Surgical Follow-up      Do you have any unusual vaginal bleeding, discharge or irritation? Pt c/o a light pink discharge  Do you have any changes in your bowel movements? No  Have you been experiencing nausea or vomiting? No  Have you been experiencing any continuous or worsening abdominal pain? No  Any urinary burning? No    Visit Vitals  /73 (BP 1 Location: Right arm, BP Patient Position: Sitting)   Pulse 77   Temp 98.5 °F (36.9 °C) (Oral)   Resp 12   Ht 5' 5\" (1.651 m)   Wt 44.5 kg (98 lb)   SpO2 100%   BMI 16.31 kg/m²         1. Have you been to the ER, urgent care clinic since your last visit? Hospitalized since your last visit? No    2. Have you seen or consulted any other health care providers outside of the [address] [place] [address] since your last visit? Include any pap smears or colon screening. No    3 most recent PHQ Screens 2/11/2022   Little interest or pleasure in doing things Not at all   Feeling down, depressed, irritable, or hopeless Not at all   Total Score PHQ 2 0       No flowsheet data found.     [facility]  [address], [address], [address]  [place], [address]  [phone]  Reese Duckworth DO      Postoperative Office Note  Patient ID:  Name: Lizzy Velázquez Familia  MRM: [id number]  : 1985/36 y.o. Date: 2022    SUBJECTIVE:    This is a 39 y.o.  female who presents s/p Robotic-assisted total laparoscopic hysterectomy and bilateral salpingo-oophorectomy on 3/10/2022  Has had some spotting. Otherwise her bowel symptoms have resolved. Her pathology revealed   Uterus, cervix, and bilateral ovaries and fallopian tubes, hysterectomy        Cervix mild chronic inflammation and reactive changes.             No dysplasia identified.        Endometrium secretory endometrium.           No hyperplasia or carcinoma identified.        Myometrium no specific pathologic abnormality.        Bilateral ovaries corpora albicantia, corpus luteum, and benign        follicular cysts.           No malignancy identified.        Bilateral fallopian tubes paratubal cysts. Medications:     Current Outpatient Medications on File Prior to Visit   Medication Sig Dispense Refill    polyethylene glycol (Miralax) 17 gram/dose powder Take 17 g by mouth daily. (Patient not taking: Reported on 3/25/2022)      multivitamin (ONE A DAY) tablet Take 1 Tablet by mouth daily. (Patient not taking: Reported on 3/25/2022)       No current facility-administered medications on file prior to visit. Allergies:      Allergies   Allergen Reactions    Codeine Swelling    Motrin [Ibuprofen] Swelling       OBJECTIVE:    Vitals:   Visit Vitals  /73 (BP 1 Location: Right arm, BP Patient Position: Sitting)   Pulse 77   Temp 98.5 °F (36.9 °C) (Oral)   Resp 12   Ht 5' 5\" (1.651 m)   Wt 44.5 kg (98 lb)   SpO2 100%   BMI 16.31 kg/m²       Physical Examination:    General:  alert, cooperative, no distress   Abdomen: soft, , non-tender   Incision: healing well   Pelvic: NEFG, Spec exam revealed vaginal cuff intact, BME revealed cuff intact, NT, small amount of blood in vault   Rectal: not done   Extremity:   extremities normal, atraumatic, no cyanosis or edema IMPRESSION/PLAN:    Linda Bragg is Doing well postoperatively. .  She has a working diagnosis of h/o breast cancer. The operative procedures and clinical results have been reviewed with the patient. Implications of diagnosis discussed at length. All questions answered. I will see the patient back prn The patient is advised to call our office with any problems or concerns.     Merline Boot DO  Gynecologic Oncology  4/11/2022/10:39 AM      Sincerely,      Norma King MD

## 2022-04-11 NOTE — LETTER
4/11/2022    Patient: Maren Myeers   YOB: 1985   Date of Visit: 4/11/2022     Chuy Cevallos, [address] [place] [phone]  Via Fax: [phone]    Dear Chuy Cevallos, [facility],      Thank you for referring Ms. Hilda Barbosa to [facility] for evaluation. My notes for this consultation are attached. If you have questions, please do not hesitate to call me. I look forward to following your patient along with you.       Sincerely,    Carina Almendarez MD

## 2022-04-11 NOTE — PROGRESS NOTES
[address] [facility]  [address] [place] [address], [address], [address] [facility]  [address] [facility], [address] [place] [address]  [place], [address]   [phone]  Justo Dong DO      Postoperative Office Note  Patient ID:  Name: Nayana Russ  MRM: [id number]  : 1985/36 y.o. Date: 2022    SUBJECTIVE:    This is a 39 y.o.  female who presents s/p Robotic-assisted total laparoscopic hysterectomy and bilateral salpingo-oophorectomy on 3/10/2022  Has had some spotting. Otherwise her bowel symptoms have resolved. Her pathology revealed   Uterus, cervix, and bilateral ovaries and fallopian tubes, hysterectomy        Cervix mild chronic inflammation and reactive changes.             No dysplasia identified.        Endometrium secretory endometrium.           No hyperplasia or carcinoma identified.        Myometrium no specific pathologic abnormality.        Bilateral ovaries corpora albicantia, corpus luteum, and benign        follicular cysts.           No malignancy identified.        Bilateral fallopian tubes paratubal cysts. Medications:     Current Outpatient Medications on File Prior to Visit   Medication Sig Dispense Refill    polyethylene glycol (Miralax) 17 gram/dose powder Take 17 g by mouth daily. (Patient not taking: Reported on 3/25/2022)      multivitamin (ONE A DAY) tablet Take 1 Tablet by mouth daily. (Patient not taking: Reported on 3/25/2022)       No current facility-administered medications on file prior to visit. Allergies:      Allergies   Allergen Reactions    Codeine Swelling    Motrin [Ibuprofen] Swelling       OBJECTIVE:    Vitals:   Visit Vitals  /73 (BP 1 Location: Right arm, BP Patient Position: Sitting)   Pulse 77   Temp 98.5 °F (36.9 °C) (Oral)   Resp 12   Ht 5' 5\" (1.651 m)   Wt 44.5 kg (98 lb)   SpO2 100%   BMI 16.31 kg/m²       Physical Examination:    General:  alert, cooperative, no distress   Abdomen: soft, , non-tender   Incision: healing well   Pelvic: NEFG, Spec exam revealed vaginal cuff intact, BME revealed cuff intact, NT, small amount of blood in vault   Rectal: not done   Extremity:   extremities normal, atraumatic, no cyanosis or edema     IMPRESSION/PLAN:    Cash Elkins is Doing well postoperatively. .  She has a working diagnosis of h/o breast cancer. The operative procedures and clinical results have been reviewed with the patient. Implications of diagnosis discussed at length. All questions answered. I will see the patient back prn The patient is advised to call our office with any problems or concerns.     Nic Arreola DO  Gynecologic Oncology  4/11/2022/10:39 AM

## 2022-04-11 NOTE — PROGRESS NOTES
Vladimir Jorge is a 39 y.o. female presents in office for 4 week post op. Chief Complaint   Patient presents with    Surgical Follow-up      Do you have any unusual vaginal bleeding, discharge or irritation? Pt c/o a light pink discharge  Do you have any changes in your bowel movements? No  Have you been experiencing nausea or vomiting? No  Have you been experiencing any continuous or worsening abdominal pain? No  Any urinary burning? No    Visit Vitals  /73 (BP 1 Location: Right arm, BP Patient Position: Sitting)   Pulse 77   Temp 98.5 °F (36.9 °C) (Oral)   Resp 12   Ht 5' 5\" (1.651 m)   Wt 44.5 kg (98 lb)   SpO2 100%   BMI 16.31 kg/m²         1. Have you been to the ER, urgent care clinic since your last visit? Hospitalized since your last visit? No    2. Have you seen or consulted any other health care providers outside of the [address] [place] [address] since your last visit? Include any pap smears or colon screening. No    3 most recent PHQ Screens 2/11/2022   Little interest or pleasure in doing things Not at all   Feeling down, depressed, irritable, or hopeless Not at all   Total Score PHQ 2 0       No flowsheet data found.

## (undated) DEVICE — PREP SKN CHLRAPRP APL 26ML STR --

## (undated) DEVICE — GARMENT,MEDLINE,DVT,INT,CALF,MED, GEN2: Brand: MEDLINE

## (undated) DEVICE — DISPOSABLE SUCTION/IRRIGATOR TUBE SET WITH TIP: Brand: AHTO

## (undated) DEVICE — GLOVE SURG SZ 75 CRM LTX FREE POLYISOPRENE POLYMER BEAD ANTI

## (undated) DEVICE — DERMABOND SKIN ADH 0.7ML -- DERMABOND ADVANCED 12/BX

## (undated) DEVICE — GLOVE SURG SZ 8 L12IN FNGR THK79MIL GRN LTX FREE

## (undated) DEVICE — SYSTEM ES CUP DIA3.5CM PNEUMO OCCL BLLN DISP FOR CLIN POS

## (undated) DEVICE — ROBOTIC PACK: Brand: MEDLINE INDUSTRIES, INC.

## (undated) DEVICE — COVER MPLR TIP CRV SCIS ACC DA VINCI

## (undated) DEVICE — SOL IRRIGATION INJ NACL 0.9% 500ML BTL

## (undated) DEVICE — TOWEL,OR,DSP,ST,BLUE,STD,4/PK,20PK/CS: Brand: MEDLINE

## (undated) DEVICE — SUT MONOCRYL PLUS UD 4-0 --

## (undated) DEVICE — REM POLYHESIVE ADULT PATIENT RETURN ELECTRODE: Brand: VALLEYLAB

## (undated) DEVICE — TRI-LUMEN FILTERED TUBE SET WITH ACTIVATED CHARCOAL FILTER: Brand: AIRSEAL

## (undated) DEVICE — SOLUTION LACTATED RINGERS INJECTION USP

## (undated) DEVICE — SEAL UNIV 5-8MM DISP BX/10 -- DA VINCI XI - SNGL USE

## (undated) DEVICE — COLUMN DRAPE

## (undated) DEVICE — AIRSEAL 5 MM ACCESS PORT AND LOW PROFILE OBTURATOR WITH BLADELESS OPTICAL TIP, 120 MM LENGTH: Brand: AIRSEAL

## (undated) DEVICE — TOTAL TRAY, DB, 100% SILI FOLEY, 16FR 10: Brand: MEDLINE

## (undated) DEVICE — SUTURE PDS II SZ 2-0 L27IN ABSRB VLT L36MM CT-1 1/2 CIR Z339H

## (undated) DEVICE — PAD PT POS 36 IN SURGYPAD DISP

## (undated) DEVICE — BLADELESS OBTURATOR: Brand: WECK VISTA

## (undated) DEVICE — SYR 10ML LUER LOK 1/5ML GRAD --

## (undated) DEVICE — TRAP MUCUS SPECIMEN 40ML -- MEDICHOICE

## (undated) DEVICE — VISUALIZATION SYSTEM: Brand: CLEARIFY

## (undated) DEVICE — SYSTEM ES CUP DIA3CM PNEUMO OCCL BLLN DISP FOR CLIN POS

## (undated) DEVICE — ARM DRAPE

## (undated) DEVICE — DRAPE XR C ARM 41X74IN LF --

## (undated) DEVICE — TIP IU L6CM DIA6.7MM WHT SIL FLX DISP RUMI II